# Patient Record
Sex: MALE | URBAN - NONMETROPOLITAN AREA
[De-identification: names, ages, dates, MRNs, and addresses within clinical notes are randomized per-mention and may not be internally consistent; named-entity substitution may affect disease eponyms.]

---

## 2017-01-23 ENCOUNTER — OFFICE VISIT (OUTPATIENT)
Dept: NEUROLOGY | Age: 52
End: 2017-01-23
Payer: COMMERCIAL

## 2017-01-23 VITALS
HEIGHT: 72 IN | WEIGHT: 247 LBS | BODY MASS INDEX: 33.46 KG/M2 | RESPIRATION RATE: 18 BRPM | DIASTOLIC BLOOD PRESSURE: 88 MMHG | HEART RATE: 74 BPM | SYSTOLIC BLOOD PRESSURE: 137 MMHG

## 2017-01-23 DIAGNOSIS — G70.00 MYASTHENIA GRAVIS WITHOUT EXACERBATION (HCC): Primary | ICD-10-CM

## 2017-01-23 DIAGNOSIS — G47.33 SLEEP APNEA, OBSTRUCTIVE: ICD-10-CM

## 2017-01-23 DIAGNOSIS — C37 MALIGNANT THYMOMA (HCC): ICD-10-CM

## 2017-01-23 DIAGNOSIS — J98.6 DIAPHRAGMATIC PARALYSIS: ICD-10-CM

## 2017-01-23 PROCEDURE — 99213 OFFICE O/P EST LOW 20 MIN: CPT | Performed by: PSYCHIATRY & NEUROLOGY

## 2017-01-23 RX ORDER — PROPAFENONE HYDROCHLORIDE 150 MG/1
150 TABLET, FILM COATED ORAL
COMMUNITY

## 2017-01-23 RX ORDER — OLMESARTAN MEDOXOMIL / AMLODIPINE BESYLATE / HYDROCHLOROTHIAZIDE 40; 10; 12.5 MG/1; MG/1; MG/1
TABLET, FILM COATED ORAL DAILY
COMMUNITY

## 2017-01-23 RX ORDER — ALBUTEROL SULFATE 90 UG/1
1 AEROSOL, METERED RESPIRATORY (INHALATION)
COMMUNITY
Start: 2015-08-11

## 2017-01-23 RX ORDER — ERGOCALCIFEROL 1.25 MG/1
50000 CAPSULE ORAL WEEKLY
COMMUNITY

## 2017-01-23 RX ORDER — METOPROLOL TARTRATE 50 MG/1
50 TABLET, FILM COATED ORAL
COMMUNITY
Start: 2015-01-27

## 2017-01-23 RX ORDER — PYRIDOSTIGMINE BROMIDE 60 MG/1
120 TABLET ORAL 3 TIMES DAILY
Qty: 180 TABLET | Refills: 5 | Status: SHIPPED | OUTPATIENT
Start: 2017-01-23 | End: 2018-01-25 | Stop reason: ALTCHOICE

## 2017-01-23 RX ORDER — PYRIDOSTIGMINE BROMIDE 60 MG/1
60 TABLET ORAL 3 TIMES DAILY
COMMUNITY
Start: 2015-01-27 | End: 2017-01-23 | Stop reason: SDUPTHER

## 2018-01-25 ENCOUNTER — OFFICE VISIT (OUTPATIENT)
Dept: NEUROLOGY | Age: 53
End: 2018-01-25
Payer: COMMERCIAL

## 2018-01-25 VITALS
HEIGHT: 72 IN | HEART RATE: 71 BPM | WEIGHT: 256 LBS | DIASTOLIC BLOOD PRESSURE: 82 MMHG | SYSTOLIC BLOOD PRESSURE: 119 MMHG | BODY MASS INDEX: 34.67 KG/M2

## 2018-01-25 DIAGNOSIS — G70.00 MYASTHENIA GRAVIS WITHOUT EXACERBATION (HCC): Primary | ICD-10-CM

## 2018-01-25 DIAGNOSIS — G47.33 SLEEP APNEA, OBSTRUCTIVE: ICD-10-CM

## 2018-01-25 DIAGNOSIS — J98.6 DIAPHRAGM PARALYSIS: ICD-10-CM

## 2018-01-25 PROCEDURE — 99213 OFFICE O/P EST LOW 20 MIN: CPT | Performed by: PSYCHIATRY & NEUROLOGY

## 2018-01-25 NOTE — PROGRESS NOTES
[facility] Neurology Follow Up Encounter  2018 10:17 AM    Information:   Patient Name: Ramon Rowan  :   1965  Age:   45 y.o. MRN:   [id number]  Account #:  [de-identified]  Today:  18    Provider: Oscar Weiner M.D. Chief Complaint:   Chief Complaint   Patient presents with    1 Year Follow Up     Patient states he has nonew symptoms at this time. Subjective:   Ramon Rowan is a 45 y.o. man  with a history of myasthenia gravis, pAfib, YARELY, malignant thymoma s/p resection and radiation who is following up. He is doing very well. He has stopped the Mestinon. He has not had any diplopia, ptosis, dysarthria, dysphagia, facial weakness. He uses his CPAP nightly. He rests well. He has started to exercise. He does get fatigued with physical activity and does believe a mestinon taken prior to exercise does help him recover faster. He does have some dyspnea on exertion but that seems to be better since exercising. Objective:     Past Medical History:  Past Medical History:   Diagnosis Date    Atrial fibrillation ([address]     Hypertension     Malignant thymoma ([address]     MG (myasthenia gravis) ([address]     Sleep apnea        Past Surgical History:   Procedure Laterality Date    CHOLECYSTECTOMY      COLONOSCOPY      FRACTURE SURGERY      HERNIA REPAIR      THYMECTOMY      VASECTOMY         Recent Hospitalizations  · None    Significant Injuries  · None    Habits  Ramon Rowan reports that he quit smoking about 6 years ago. His smoking use included Cigarettes. He has a 45.00 pack-year smoking history. He has never used smokeless tobacco. He reports that he does not drink alcohol or use drugs. History reviewed. No pertinent family history. Social History  Ramon Rowan is , lives in [place], [place], and works for an [employer].     Medications:  Current Outpatient Prescriptions   Medication Sig Dispense Refill    apixaban (ELIQUIS) 5 MG TABS